# Patient Record
Sex: MALE | Race: WHITE | NOT HISPANIC OR LATINO | Employment: FULL TIME | URBAN - METROPOLITAN AREA
[De-identification: names, ages, dates, MRNs, and addresses within clinical notes are randomized per-mention and may not be internally consistent; named-entity substitution may affect disease eponyms.]

---

## 2018-07-08 ENCOUNTER — HOSPITAL ENCOUNTER (OUTPATIENT)
Facility: HOSPITAL | Age: 23
Setting detail: OBSERVATION
Discharge: HOME | End: 2018-07-09
Attending: SURGERY | Admitting: SURGERY
Payer: COMMERCIAL

## 2018-07-08 ENCOUNTER — HOSPITAL ENCOUNTER (EMERGENCY)
Facility: HOSPITAL | Age: 23
End: 2018-07-08
Attending: EMERGENCY MEDICINE | Admitting: EMERGENCY MEDICINE
Payer: COMMERCIAL

## 2018-07-08 ENCOUNTER — APPOINTMENT (EMERGENCY)
Dept: CT IMAGING | Facility: HOSPITAL | Age: 23
End: 2018-07-08
Payer: COMMERCIAL

## 2018-07-08 ENCOUNTER — APPOINTMENT (OUTPATIENT)
Dept: RADIOLOGY | Age: 23
End: 2018-07-08
Payer: COMMERCIAL

## 2018-07-08 ENCOUNTER — OFFICE VISIT (OUTPATIENT)
Dept: URGENT CARE | Age: 23
End: 2018-07-08
Payer: COMMERCIAL

## 2018-07-08 VITALS
BODY MASS INDEX: 18.75 KG/M2 | SYSTOLIC BLOOD PRESSURE: 154 MMHG | HEIGHT: 70 IN | TEMPERATURE: 98.4 F | HEART RATE: 99 BPM | WEIGHT: 131 LBS | RESPIRATION RATE: 18 BRPM | OXYGEN SATURATION: 98 % | DIASTOLIC BLOOD PRESSURE: 71 MMHG

## 2018-07-08 VITALS
TEMPERATURE: 98.3 F | HEART RATE: 92 BPM | DIASTOLIC BLOOD PRESSURE: 64 MMHG | SYSTOLIC BLOOD PRESSURE: 113 MMHG | OXYGEN SATURATION: 99 % | RESPIRATION RATE: 16 BRPM

## 2018-07-08 DIAGNOSIS — S39.91XA BLUNT ABDOMINAL TRAUMA, INITIAL ENCOUNTER: Primary | ICD-10-CM

## 2018-07-08 DIAGNOSIS — S79.911A HIP INJURY, RIGHT, INITIAL ENCOUNTER: Primary | ICD-10-CM
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
## 2018-07-08 PROBLEM — R18.8 FREE FLUID IN PELVIS: Status: ACTIVE | Noted: 2018-07-08

## 2018-07-08 LAB
ABO GROUP BLD: NORMAL
ANION GAP BLD CALC-SCNC: 18 MMOL/L (ref 4–13)
APTT PPP: 25 SECONDS (ref 24–36)
BLD GP AB SCN SERPL QL: NEGATIVE
BUN BLD-MCNC: 20 MG/DL (ref 5–25)
CA-I BLD-SCNC: 1.17 MMOL/L (ref 1.12–1.32)
CHLORIDE BLD-SCNC: 101 MMOL/L (ref 100–108)
CREAT BLD-MCNC: 1.2 MG/DL (ref 0.6–1.3)
GFR SERPL CREATININE-BSD FRML MDRD: 84 ML/MIN/1.73SQ M
GLUCOSE SERPL-MCNC: 98 MG/DL (ref 65–140)
HCT VFR BLD CALC: 48 % (ref 36.5–49.3)
HGB BLDA-MCNC: 16.3 G/DL (ref 12–17)
INR PPP: 1.06 (ref 0.86–1.17)
PCO2 BLD: 27 MMOL/L (ref 21–32)
POTASSIUM BLD-SCNC: 3.9 MMOL/L (ref 3.5–5.3)
PROTHROMBIN TIME: 13.9 SECONDS (ref 11.8–14.2)
RH BLD: POSITIVE
SODIUM BLD-SCNC: 141 MMOL/L (ref 136–145)
SPECIMEN EXPIRATION DATE: NORMAL
SPECIMEN SOURCE: ABNORMAL

## 2018-07-08 PROCEDURE — 86850 RBC ANTIBODY SCREEN: CPT | Performed by: EMERGENCY MEDICINE

## 2018-07-08 PROCEDURE — 86901 BLOOD TYPING SEROLOGIC RH(D): CPT | Performed by: EMERGENCY MEDICINE

## 2018-07-08 PROCEDURE — 85025 COMPLETE CBC W/AUTO DIFF WBC: CPT | Performed by: EMERGENCY MEDICINE

## 2018-07-08 PROCEDURE — 86900 BLOOD TYPING SEROLOGIC ABO: CPT | Performed by: EMERGENCY MEDICINE

## 2018-07-08 PROCEDURE — 80053 COMPREHEN METABOLIC PANEL: CPT | Performed by: EMERGENCY MEDICINE

## 2018-07-08 PROCEDURE — 36415 COLL VENOUS BLD VENIPUNCTURE: CPT | Performed by: EMERGENCY MEDICINE

## 2018-07-08 PROCEDURE — 90715 TDAP VACCINE 7 YRS/> IM: CPT | Performed by: EMERGENCY MEDICINE

## 2018-07-08 PROCEDURE — 96374 THER/PROPH/DIAG INJ IV PUSH: CPT

## 2018-07-08 PROCEDURE — G0382 LEV 3 HOSP TYPE B ED VISIT: HCPCS | Performed by: PHYSICIAN ASSISTANT

## 2018-07-08 PROCEDURE — 80047 BASIC METABLC PNL IONIZED CA: CPT

## 2018-07-08 PROCEDURE — 85014 HEMATOCRIT: CPT

## 2018-07-08 PROCEDURE — 96361 HYDRATE IV INFUSION ADD-ON: CPT

## 2018-07-08 PROCEDURE — 99218 PR INITIAL OBSERVATION CARE/DAY 30 MINUTES: CPT | Performed by: SURGERY

## 2018-07-08 PROCEDURE — 74177 CT ABD & PELVIS W/CONTRAST: CPT

## 2018-07-08 PROCEDURE — 85610 PROTHROMBIN TIME: CPT | Performed by: EMERGENCY MEDICINE

## 2018-07-08 PROCEDURE — 85730 THROMBOPLASTIN TIME PARTIAL: CPT | Performed by: EMERGENCY MEDICINE

## 2018-07-08 PROCEDURE — 99285 EMERGENCY DEPT VISIT HI MDM: CPT

## 2018-07-08 RX ORDER — SENNOSIDES 8.6 MG
1 TABLET ORAL
Status: DISCONTINUED | OUTPATIENT
Start: 2018-07-08 | End: 2018-07-09 | Stop reason: HOSPADM

## 2018-07-08 RX ORDER — OXYCODONE HYDROCHLORIDE 10 MG/1
10 TABLET ORAL EVERY 4 HOURS PRN
Status: DISCONTINUED | OUTPATIENT
Start: 2018-07-08 | End: 2018-07-09 | Stop reason: HOSPADM

## 2018-07-08 RX ORDER — ACETAMINOPHEN 325 MG/1
650 TABLET ORAL EVERY 6 HOURS PRN
Status: DISCONTINUED | OUTPATIENT
Start: 2018-07-08 | End: 2018-07-09 | Stop reason: HOSPADM

## 2018-07-08 RX ORDER — DOCUSATE SODIUM 100 MG/1
100 CAPSULE, LIQUID FILLED ORAL 2 TIMES DAILY
Status: DISCONTINUED | OUTPATIENT
Start: 2018-07-09 | End: 2018-07-09 | Stop reason: HOSPADM

## 2018-07-08 RX ORDER — METHOCARBAMOL 500 MG/1
500 TABLET, FILM COATED ORAL EVERY 6 HOURS PRN
Status: DISCONTINUED | OUTPATIENT
Start: 2018-07-08 | End: 2018-07-09 | Stop reason: HOSPADM

## 2018-07-08 RX ORDER — OXYCODONE HYDROCHLORIDE 5 MG/1
5 TABLET ORAL EVERY 4 HOURS PRN
Status: DISCONTINUED | OUTPATIENT
Start: 2018-07-08 | End: 2018-07-09 | Stop reason: HOSPADM

## 2018-07-08 RX ORDER — MORPHINE SULFATE 4 MG/ML
4 INJECTION, SOLUTION INTRAMUSCULAR; INTRAVENOUS ONCE
Status: COMPLETED | OUTPATIENT
Start: 2018-07-08 | End: 2018-07-08

## 2018-07-08 RX ORDER — SODIUM CHLORIDE 9 MG/ML
100 INJECTION, SOLUTION INTRAVENOUS CONTINUOUS
Status: DISCONTINUED | OUTPATIENT
Start: 2018-07-08 | End: 2018-07-09 | Stop reason: HOSPADM

## 2018-07-08 RX ADMIN — SODIUM CHLORIDE 1000 ML: 0.9 INJECTION, SOLUTION INTRAVENOUS at 21:37

## 2018-07-08 RX ADMIN — SODIUM CHLORIDE 100 ML/HR: 0.9 INJECTION, SOLUTION INTRAVENOUS at 23:55

## 2018-07-08 RX ADMIN — IOHEXOL 100 ML: 350 INJECTION, SOLUTION INTRAVENOUS at 19:57

## 2018-07-08 RX ADMIN — MORPHINE SULFATE 4 MG: 4 INJECTION, SOLUTION INTRAMUSCULAR; INTRAVENOUS at 21:37

## 2018-07-08 RX ADMIN — TETANUS TOXOID, REDUCED DIPHTHERIA TOXOID AND ACELLULAR PERTUSSIS VACCINE, ADSORBED 0.5 ML: 5; 2.5; 8; 8; 2.5 SUSPENSION INTRAMUSCULAR at 23:55
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
## 2018-07-09 VITALS
TEMPERATURE: 98.2 F | OXYGEN SATURATION: 97 % | BODY MASS INDEX: 19.35 KG/M2 | WEIGHT: 138.23 LBS | HEART RATE: 72 BPM | HEIGHT: 71 IN | DIASTOLIC BLOOD PRESSURE: 53 MMHG | SYSTOLIC BLOOD PRESSURE: 108 MMHG | RESPIRATION RATE: 20 BRPM

## 2018-07-09 PROBLEM — S70.01XA CONTUSION OF RIGHT HIP: Status: ACTIVE | Noted: 2018-07-09

## 2018-07-09 PROBLEM — T07.XXXA MULTIPLE ABRASIONS: Status: ACTIVE | Noted: 2018-07-09

## 2018-07-09 LAB
ABO GROUP BLD: NORMAL
ALBUMIN SERPL BCP-MCNC: 4.1 G/DL (ref 3.5–5)
ALP SERPL-CCNC: 51 U/L (ref 46–116)
ALT SERPL W P-5'-P-CCNC: 76 U/L (ref 12–78)
ANION GAP SERPL CALCULATED.3IONS-SCNC: 13 MMOL/L (ref 4–13)
ANION GAP SERPL CALCULATED.3IONS-SCNC: 7 MMOL/L (ref 4–13)
AST SERPL W P-5'-P-CCNC: 42 U/L (ref 5–45)
BACTERIA UR QL AUTO: ABNORMAL /HPF
BASOPHILS # BLD AUTO: 0.02 THOUSANDS/ΜL (ref 0–0.1)
BASOPHILS # BLD AUTO: 0.03 THOUSANDS/ΜL (ref 0–0.1)
BASOPHILS NFR BLD AUTO: 0 % (ref 0–1)
BASOPHILS NFR BLD AUTO: 0 % (ref 0–1)
BILIRUB SERPL-MCNC: 1.01 MG/DL (ref 0.2–1)
BILIRUB UR QL STRIP: NEGATIVE
BLD GP AB SCN SERPL QL: NEGATIVE
BUN SERPL-MCNC: 15 MG/DL (ref 5–25)
BUN SERPL-MCNC: 17 MG/DL (ref 5–25)
CALCIUM SERPL-MCNC: 8.5 MG/DL (ref 8.3–10.1)
CALCIUM SERPL-MCNC: 8.8 MG/DL (ref 8.3–10.1)
CHLORIDE SERPL-SCNC: 103 MMOL/L (ref 100–108)
CHLORIDE SERPL-SCNC: 106 MMOL/L (ref 100–108)
CLARITY UR: CLEAR
CO2 SERPL-SCNC: 23 MMOL/L (ref 21–32)
CO2 SERPL-SCNC: 26 MMOL/L (ref 21–32)
COLOR UR: YELLOW
CREAT SERPL-MCNC: 1.06 MG/DL (ref 0.6–1.3)
CREAT SERPL-MCNC: 1.11 MG/DL (ref 0.6–1.3)
EOSINOPHIL # BLD AUTO: 0.01 THOUSAND/ΜL (ref 0–0.61)
EOSINOPHIL # BLD AUTO: 0.02 THOUSAND/ΜL (ref 0–0.61)
EOSINOPHIL NFR BLD AUTO: 0 % (ref 0–6)
EOSINOPHIL NFR BLD AUTO: 0 % (ref 0–6)
ERYTHROCYTE [DISTWIDTH] IN BLOOD BY AUTOMATED COUNT: 13.2 % (ref 11.6–15.1)
ERYTHROCYTE [DISTWIDTH] IN BLOOD BY AUTOMATED COUNT: 13.2 % (ref 11.6–15.1)
GFR SERPL CREATININE-BSD FRML MDRD: 92 ML/MIN/1.73SQ M
GFR SERPL CREATININE-BSD FRML MDRD: 98 ML/MIN/1.73SQ M
GLUCOSE P FAST SERPL-MCNC: 82 MG/DL (ref 65–99)
GLUCOSE SERPL-MCNC: 82 MG/DL (ref 65–140)
GLUCOSE SERPL-MCNC: 89 MG/DL (ref 65–140)
GLUCOSE UR STRIP-MCNC: NEGATIVE MG/DL
HCT VFR BLD AUTO: 42 % (ref 36.5–49.3)
HCT VFR BLD AUTO: 43 % (ref 36.5–49.3)
HGB BLD-MCNC: 13.9 G/DL (ref 12–17)
HGB BLD-MCNC: 14.6 G/DL (ref 12–17)
HGB UR QL STRIP.AUTO: ABNORMAL
HYALINE CASTS #/AREA URNS LPF: ABNORMAL /LPF
IMM GRANULOCYTES # BLD AUTO: 0.02 THOUSAND/UL (ref 0–0.2)
IMM GRANULOCYTES # BLD AUTO: 0.06 THOUSAND/UL (ref 0–0.2)
IMM GRANULOCYTES NFR BLD AUTO: 0 % (ref 0–2)
IMM GRANULOCYTES NFR BLD AUTO: 0 % (ref 0–2)
KETONES UR STRIP-MCNC: ABNORMAL MG/DL
LEUKOCYTE ESTERASE UR QL STRIP: NEGATIVE
LYMPHOCYTES # BLD AUTO: 1.48 THOUSANDS/ΜL (ref 0.6–4.47)
LYMPHOCYTES # BLD AUTO: 1.5 THOUSANDS/ΜL (ref 0.6–4.47)
LYMPHOCYTES NFR BLD AUTO: 11 % (ref 14–44)
LYMPHOCYTES NFR BLD AUTO: 18 % (ref 14–44)
MCH RBC QN AUTO: 28.6 PG (ref 26.8–34.3)
MCH RBC QN AUTO: 29.1 PG (ref 26.8–34.3)
MCHC RBC AUTO-ENTMCNC: 33.1 G/DL (ref 31.4–37.4)
MCHC RBC AUTO-ENTMCNC: 34 G/DL (ref 31.4–37.4)
MCV RBC AUTO: 86 FL (ref 82–98)
MCV RBC AUTO: 86 FL (ref 82–98)
MONOCYTES # BLD AUTO: 0.73 THOUSAND/ΜL (ref 0.17–1.22)
MONOCYTES # BLD AUTO: 0.9 THOUSAND/ΜL (ref 0.17–1.22)
MONOCYTES NFR BLD AUTO: 7 % (ref 4–12)
MONOCYTES NFR BLD AUTO: 9 % (ref 4–12)
NEUTROPHILS # BLD AUTO: 11.35 THOUSANDS/ΜL (ref 1.85–7.62)
NEUTROPHILS # BLD AUTO: 6.15 THOUSANDS/ΜL (ref 1.85–7.62)
NEUTS SEG NFR BLD AUTO: 73 % (ref 43–75)
NEUTS SEG NFR BLD AUTO: 82 % (ref 43–75)
NITRITE UR QL STRIP: NEGATIVE
NON-SQ EPI CELLS URNS QL MICRO: ABNORMAL /HPF
NRBC BLD AUTO-RTO: 0 /100 WBCS
NRBC BLD AUTO-RTO: 0 /100 WBCS
PH UR STRIP.AUTO: 6 [PH] (ref 4.5–8)
PLATELET # BLD AUTO: 198 THOUSANDS/UL (ref 149–390)
PLATELET # BLD AUTO: 210 THOUSANDS/UL (ref 149–390)
PMV BLD AUTO: 10.3 FL (ref 8.9–12.7)
PMV BLD AUTO: 9.7 FL (ref 8.9–12.7)
POTASSIUM SERPL-SCNC: 4 MMOL/L (ref 3.5–5.3)
POTASSIUM SERPL-SCNC: 4.1 MMOL/L (ref 3.5–5.3)
PROT SERPL-MCNC: 6.7 G/DL (ref 6.4–8.2)
PROT UR STRIP-MCNC: NEGATIVE MG/DL
RBC # BLD AUTO: 4.86 MILLION/UL (ref 3.88–5.62)
RBC # BLD AUTO: 5.01 MILLION/UL (ref 3.88–5.62)
RBC #/AREA URNS AUTO: ABNORMAL /HPF
RH BLD: POSITIVE
SODIUM SERPL-SCNC: 139 MMOL/L (ref 136–145)
SODIUM SERPL-SCNC: 139 MMOL/L (ref 136–145)
SP GR UR STRIP.AUTO: 1.02 (ref 1–1.03)
SPECIMEN EXPIRATION DATE: NORMAL
UROBILINOGEN UR QL STRIP.AUTO: 0.2 E.U./DL
WBC # BLD AUTO: 13.85 THOUSAND/UL (ref 4.31–10.16)
WBC # BLD AUTO: 8.42 THOUSAND/UL (ref 4.31–10.16)
WBC #/AREA URNS AUTO: ABNORMAL /HPF

## 2018-07-09 PROCEDURE — 80048 BASIC METABOLIC PNL TOTAL CA: CPT | Performed by: PHYSICIAN ASSISTANT

## 2018-07-09 PROCEDURE — 85025 COMPLETE CBC W/AUTO DIFF WBC: CPT | Performed by: PHYSICIAN ASSISTANT

## 2018-07-09 PROCEDURE — 99217 PR OBSERVATION CARE DISCHARGE MANAGEMENT: CPT | Performed by: PHYSICIAN ASSISTANT

## 2018-07-09 PROCEDURE — 99285 EMERGENCY DEPT VISIT HI MDM: CPT

## 2018-07-09 PROCEDURE — 81001 URINALYSIS AUTO W/SCOPE: CPT | Performed by: PHYSICIAN ASSISTANT

## 2018-07-09 RX ADMIN — DOCUSATE SODIUM 100 MG: 100 CAPSULE, LIQUID FILLED ORAL at 09:11

## 2018-07-09 RX ADMIN — SENNOSIDES 8.6 MG: 8.6 TABLET, FILM COATED ORAL at 01:41

## 2018-07-09 RX ADMIN — SODIUM CHLORIDE 100 ML/HR: 0.9 INJECTION, SOLUTION INTRAVENOUS at 09:11

## 2018-07-09 NOTE — PROGRESS NOTES
Trauma Tertiary Progress Note - Nitza Shown 1995, 24 y o  male MRN: [id number]    Unit/Bed#: [address] 823-01 Encounter: [id number]    Primary Care Provider: Puneet Gaona MD   Date and time admitted to hospital: 7/8/2018 10:48 PM        Multiple abrasions   Assessment & Plan    - local wound care        Contusion of right hip   Assessment & Plan    - right ASIS with concurrent skin abrasion  - tender to palpation  - local wound care, ice        * Free fluid in pelvis   Assessment & Plan    - repeat Hgb stable  - no peritoneal signs or hemodynamic instability to suggest ongoing bleeding  - check urinalysis  - discharge later today if remains stable            Summary of Diagnosed Injuries:   Contusion of right hip/ASIS  Free fluid in pelvis  Multiple abrasions    Clinical Plan:   U/A to check for blood  Possible discharge later today    Mechanism of Injury: skateboarding    Transfer from: n/a  Outside Films Received: not applicable  Tertiary Exam Due on: today    Vitals: Blood pressure 108/53, pulse 72, temperature 98 2 °F (36 8 °C), temperature source Oral, resp  rate 20, height 5' 11" (1 803 m), weight 62 7 kg (138 lb 3 7 oz), SpO2 97 %  ,Body mass index is 19 28 kg/m²  CT / RADIOGRAPHS: ALL RESULTS MUST BE CONFIRMED BY FACULTY OR PRINTED REPORT    CT HEAD:  CT CHEST:    CT FACE:  CT ABDOMEN / PELVIS: Trace amount of fluid along the right side of the distal sigmoid in the pelvis is abnormal for a male patient and could be posttraumatic (I cannot exclude visceral bowel injury if clinically suspected although no definite CT signs of injury identified in   addition to no evidence of pneumoperitoneum)        Trace amount of fluid or fatty infiltration posterior to the right paracolic cutter and could represent small amount of posttraumatic blood      Diffuse bladder wall thickening, correlate with urinalysis     CT CERVICAL SPINE:  XR PELVIS:    CT THORACIC / LUMBAR SPINE:  CXR CHEST:    OTHER: OTHER: OTHER:  OTHER:    OTHER: OTHER:    OTHER:  OTHER:    OTHER:  OTHER:      Consultants - List Service/ Faculty and Date:   none    Active medications:           Current Facility-Administered Medications:     acetaminophen (TYLENOL) tablet 650 mg, 650 mg, Oral, Q6H PRN    docusate sodium (COLACE) capsule 100 mg, 100 mg, Oral, BID, 100 mg at 07/09/18 0911    methocarbamol (ROBAXIN) tablet 500 mg, 500 mg, Oral, Q6H PRN    oxyCODONE (ROXICODONE) immediate release tablet 10 mg, 10 mg, Oral, Q4H PRN    oxyCODONE (ROXICODONE) IR tablet 5 mg, 5 mg, Oral, Q4H PRN    senna (SENOKOT) tablet 8 6 mg, 1 tablet, Oral, HS, 8 6 mg at 07/09/18 0141    sodium chloride 0 9 % infusion, 100 mL/hr, Intravenous, Continuous, 100 mL/hr at 07/09/18 0911      Intake/Output Summary (Last 24 hours) at 07/09/18 1150  Last data filed at 07/09/18 1100   Gross per 24 hour   Intake          1633 33 ml   Output             1375 ml   Net           258 33 ml       Invasive Devices     Peripheral Intravenous Line            Peripheral IV 07/08/18 Right Antecubital less than 1 day                CAGE-AID Questionnaire:    Was the patient able to participate in the CAGE-AID screening questions on admission? Yes    Is the patient 65 years or older: No    1  GCS:  GCS Total:  15, Eye Opening:   Spontaneous = 4, Motor Response: Obeys commands = 6 and Verbal Response:  Oriented = 5  2  Head:   WNL  3  Neck:   WNL  4  Chest:   WNL  5  Abdomen/Pelvis:   WNL  6  Back (log roll with spinal immobilization unless cleared radiographically): WNL  7  Extremities:   Lacs, abrasions, swelling, ecchymosis: abrasion right shoulder and right ASIS   Tenderness, pain with motor, instability: right ASIS with ecchymosis and tenderness   8  Peripheral Nerves: WNL    Do NOT use the following abbreviations: DTO, gr, [place], MS, MSO4, MgSO4, Nitro, QD, QID, QOD, u, , ?, ?g or trailing zeros   Always use a zero before a decimal     Labs:   CBC:   Lab Results   Component Value Date    WBC 8 42 07/09/2018    HGB 13 9 07/09/2018    HCT 42 0 07/09/2018    MCV 86 07/09/2018     07/09/2018    MCH 28 6 07/09/2018    MCHC 33 1 07/09/2018    RDW 13 2 07/09/2018    MPV 9 7 07/09/2018    NRBC 0 07/09/2018     CMP:   Lab Results   Component Value Date     07/09/2018     07/09/2018    CO2 26 07/09/2018    ANIONGAP 7 07/09/2018    BUN 15 07/09/2018    CREATININE 1 11 07/09/2018    GLUCOSE 82 07/09/2018    GLUCOSE 98 07/08/2018    CALCIUM 8 8 07/09/2018    AST 42 07/08/2018    ALT 76 07/08/2018    ALKPHOS 51 07/08/2018    PROT 6 7 07/08/2018    BILITOT 1 01 (H) 07/08/2018    EGFR 92 07/09/2018    EGFR 84 07/08/2018       Nurse rounds completed with Alyssa Ni  Discussed plan of care with patient    He states understanding and agreement
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
## 2018-07-09 NOTE — ED NOTES
Pt has no pain of the hip unless he moves  Morphine for transfer       Teri Molina RN  07/08/18 8739

## 2018-07-09 NOTE — ASSESSMENT & PLAN NOTE
- repeat Hgb stable  - no peritoneal signs or hemodynamic instability to suggest ongoing bleeding  - check urinalysis  - discharge later today if remains stable
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
## 2018-07-09 NOTE — PLAN OF CARE
Problem: DISCHARGE PLANNING - CARE MANAGEMENT  Goal: Discharge to post-acute care or home with appropriate resources  INTERVENTIONS:  - Conduct assessment to determine patient/family and health care team treatment goals, and need for post-acute services based on payer coverage, community resources, and patient preferences, and barriers to discharge  - Address psychosocial, clinical, and financial barriers to discharge as identified in assessment in conjunction with the patient/family and health care team  - Arrange appropriate level of post-acute services according to patient's   needs and preference and payer coverage in collaboration with the physician and health care team  - Communicate with and update the patient/family, physician, and health care team regarding progress on the discharge plan  - Arrange appropriate transportation to post-acute venues  When medically clear will d/c home with family     Outcome: Progressing

## 2018-07-09 NOTE — SOCIAL WORK
Cm met with pt and pt aware cm role at discharge  Pt lives in an apartment alone    Pt prior to admission pt was independent all ADL"S   Pt has no history of VNA , SNF , mental health or [address] [place] [address] rehab  Pt uses [facility]   When medically clear pt father Chel Herron will transport home   No cm need at this time   CM reviewed d/c planning process including the following: identifying help at home, patient preference for d/c planning needs, Discharge Lounge, [facility] Meds to Bed program, availability of treatment team to discuss questions or concerns patient and/or family may have regarding understanding medications and recognizing signs and symptoms once discharged  CM also encouraged patient to follow up with all recommended appointments after discharge  Patient advised of importance for patient and family to participate in managing patients medical well being  Discharge checklist discussed with patient and family
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
## 2018-07-09 NOTE — DISCHARGE INSTRUCTIONS
Apply ice to your bruises on your shoulder and hip as needed for pain or swelling  You may take tylenol for pain  Apply over the counter antibiotic ointment to the abrasions twice daily    Use gentle soap and water to cleanse the abrasions daily

## 2018-07-09 NOTE — EMTALA/ACUTE CARE TRANSFER
[facility] [address]  [address] [place] [address]  Dept: [phone]      EMTALA TRANSFER CONSENT    NAME Nitza Arana                                         1995                              MRN [id number]    I have been informed of my rights regarding examination, treatment, and transfer   by Dr Simon Ventura MD    Benefits: Specialized equipment and/or services available at the receiving facility (Include comment)________________________ (Trauma service)    Risks: Increased discomfort during transfer, Loss of IV      Consent for Transfer:  I acknowledge that my medical condition has been evaluated and explained to me by the emergency department physician or other qualified medical person and/or my attending physician, who has recommended that I be transferred to the service of  Accepting Physician: Jennifer Villa at [address] Name, [address] : [facility]  The above potential benefits of such transfer, the potential risks associated with such transfer, and the probable risks of not being transferred have been explained to me, and I fully understand them  The doctor has explained that, in my case, the benefits of transfer outweigh the risks  I agree to be transferred  I authorize the performance of emergency medical procedures and treatments upon me in both transit and upon arrival at the receiving facility  Additionally, I authorize the release of any and all medical records to the receiving facility and request they be transported with me, if possible  I understand that the safest mode of transportation during a medical emergency is an ambulance and that the Hospital advocates the use of this mode of transport  Risks of traveling to the receiving facility by car, including absence of medical control, life sustaining equipment, such as oxygen, and medical personnel has been explained to me and I fully understand them      ([address])  [  ]  I consent to the stated transfer and to be transported by ambulance/helicopter  [  ]  I consent to the stated transfer, but refuse transportation by ambulance and accept full responsibility for my transportation by car  I understand the risks of non-ambulance transfers and I exonerate the Hospital and its staff from any deterioration in my condition that results from this refusal     X___________________________________________    DATE  18  TIME________  Signature of patient or legally responsible individual signing on patient behalf           RELATIONSHIP TO PATIENT_________________________          Provider Certification    NAME Maya Frances                                         1995                              MRN [id number]    A medical screening exam was performed on the above named patient  Based on the examination:    Condition Necessitating Transfer The encounter diagnosis was Blunt abdominal trauma, initial encounter  Patient Condition: The patient has been stabilized such that within reasonable medical probability, no material deterioration of the patient condition or the condition of the unborn child(santa) is likely to result from the transfer    Reason for Transfer: Level of Care needed not available at this facility    Transfer Requirements: Facility [facility]   · Space available and qualified personnel available for treatment as acknowledged by    · Agreed to accept transfer and to provide appropriate medical treatment as acknowledged by       Bonita Sam  · Appropriate medical records of the examination and treatment of the patient are provided at the time of transfer   [address], [address] _______  · Transfer will be performed by qualified personnel from    and appropriate transfer equipment as required, including the use of necessary and appropriate life support measures      Provider Certification: I have examined the patient and explained the following risks and benefits of being transferred/refusing transfer to the patient/family:  General risk, such as traffic hazards, adverse weather conditions, rough terrain or turbulence, possible failure of equipment (including vehicle or aircraft), or consequences of actions of persons outside the control of the transport personnel      Based on these reasonable risks and benefits to the patient and/or the unborn child(santa), and based upon the information available at the time of the patients examination, I certify that the medical benefits reasonably to be expected from the provision of appropriate medical treatments at another medical facility outweigh the increasing risks, if any, to the individuals medical condition, and in the case of labor to the unborn child, from effecting the transfer      X____________________________________________ DATE 07/08/18        TIME_______      ORIGINAL - SEND TO MEDICAL RECORDS   COPY - SEND WITH PATIENT DURING TRANSFER
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
